# Patient Record
Sex: MALE | Race: WHITE | URBAN - METROPOLITAN AREA
[De-identification: names, ages, dates, MRNs, and addresses within clinical notes are randomized per-mention and may not be internally consistent; named-entity substitution may affect disease eponyms.]

---

## 2021-01-28 ENCOUNTER — OUT OF OFFICE VISIT (OUTPATIENT)
Dept: URBAN - METROPOLITAN AREA MEDICAL CENTER 27 | Facility: MEDICAL CENTER | Age: 46
End: 2021-01-28
Payer: COMMERCIAL

## 2021-01-28 DIAGNOSIS — R07.89 ACUTE CHEST WALL PAIN: ICD-10-CM

## 2021-01-28 DIAGNOSIS — N17.9 ACUTE KIDNEY FAILURE: ICD-10-CM

## 2021-01-28 DIAGNOSIS — K76.0 FATTY (CHANGE OF) LIVER, NOT ELSEWHERE CLASSIFIED: ICD-10-CM

## 2021-01-28 DIAGNOSIS — R11.2 ACUTE NAUSEA WITH NONBILIOUS VOMITING: ICD-10-CM

## 2021-01-28 PROCEDURE — 99232 SBSQ HOSP IP/OBS MODERATE 35: CPT | Performed by: INTERNAL MEDICINE

## 2021-01-28 PROCEDURE — 99222 1ST HOSP IP/OBS MODERATE 55: CPT | Performed by: INTERNAL MEDICINE

## 2021-01-28 PROCEDURE — G8427 DOCREV CUR MEDS BY ELIG CLIN: HCPCS | Performed by: INTERNAL MEDICINE

## 2021-02-01 ENCOUNTER — WEB ENCOUNTER (OUTPATIENT)
Dept: URBAN - METROPOLITAN AREA CLINIC 35 | Facility: CLINIC | Age: 46
End: 2021-02-01

## 2021-02-01 ENCOUNTER — TELEPHONE ENCOUNTER (OUTPATIENT)
Dept: URBAN - METROPOLITAN AREA CLINIC 35 | Facility: CLINIC | Age: 46
End: 2021-02-01

## 2021-02-01 ENCOUNTER — OFFICE VISIT (OUTPATIENT)
Dept: URBAN - METROPOLITAN AREA CLINIC 35 | Facility: CLINIC | Age: 46
End: 2021-02-01

## 2021-02-01 VITALS — BODY MASS INDEX: 24.5 KG/M2 | HEIGHT: 71 IN | WEIGHT: 175 LBS

## 2021-02-01 RX ORDER — MULTIVIT-MIN/IRON/FOLIC ACID/K 18-600-40
1 CAPSULE ORAL ONCE A DAY
Status: ACTIVE | COMMUNITY

## 2021-02-01 RX ORDER — PANTOPRAZOLE SODIUM 40 MG/1
1 TABLET TABLET, DELAYED RELEASE ORAL BID
Status: ACTIVE | COMMUNITY

## 2021-02-01 RX ORDER — OXYCODONE HYDROCHLORIDE AND ACETAMINOPHEN 5; 325 MG/1; MG/1
1 TABLET ORAL EVERY 4 HOURS PRN
Status: ACTIVE | COMMUNITY

## 2021-02-01 RX ORDER — FAMOTIDINE 20 MG/1
1 TABLET TABLET, FILM COATED ORAL DAILY
Qty: 90 TABLET | Refills: 1 | OUTPATIENT
Start: 2021-02-01

## 2021-02-01 RX ORDER — DEXLANSOPRAZOLE 60 MG/1
1 CAPSULE CAPSULE, DELAYED RELEASE ORAL TWICE A DAY
Qty: 60 CAPSULE | Refills: 3 | OUTPATIENT
Start: 2021-02-01

## 2021-02-01 RX ORDER — SUCRALFATE 1 G
1 TABLET ORAL
Status: ACTIVE | COMMUNITY

## 2021-02-01 RX ORDER — DEXTROMETHORPHAN POLISTIREX 30 MG/5 ML
1 SUSPENSION, EXTENDED RELEASE 12 HR ORAL ONCE A DAY
Status: ACTIVE | COMMUNITY

## 2021-02-01 NOTE — HPI-MIGRATED HPI
;     Gastroesophageal Reflux : Patient presents today as a new patient for a hospital follow-up after being in [facility] from 2021-2021. He presented to the emergency department on 2021 for chest pain associated with both nausea and vomiting. Patient tested positive for COVID-19 on 2021 (mild symptoms on week after) and tested positive for COVID-19 (again), on 2021, while at [facility]. Patient had a RUQ ultrasound done on 2021 and the results were as follows :  IMPRESSION : Fatty liver.   Patient had a CT of the chest without IV contrast and a CT of the abdomen and pelvis without IV contrast on 2021 and the results were as follows :  IMPRESSION :   Mild ground glass opacities right lower lobe may be infectious in etiology. Bilateral perinephric fat stranding may be seen with chronic or medical renal disease. No evidence for urinary obstruction. Nonobstructing right renal calculus. Diffuse fatty infiltration of the liver. Diverticulosis of the colon without inflammation.   Patient had a US Renal Complete on 2021 and the results were as follows :  IMPRESSION : No acute sonographic abnormalities. Probable nonobstructive 3 mm calculus in the right mid polar kidney.   [facility] was consulted for vomiting and saw patient on 2021. Pertinent information from the Gastroenterology Consultation include the following :  "CT abd/pel without contrast on admit with findings of fatty liver, otherwise no acute intra-abdominal findings. Labs significant for LA 10/WBC 17.8k on admit, now 3.8/9.4 respectively. Tbili 1.3, ALP 55, AST 46 and ALT 31. Electrolytes unremarkable."  "[facility] consulted for vomiting. No prior history of GI disease. Follows [facility] for CRC screening since his father  of colon cancer. Last colonoscopy 2020 with adenomatous polyp and recommendations for repeat screening in 3 years. Last EGD 2016 for heartburn with findings of reflux esophagitis and antral erythema and erythematous duodenopathy."  "At the time of consult, the patient reports dry heaving after ingestion of AM pills. No coffee ground emesis or hematemesis reported. Denies diarrhea and constipation. Denies abdominal pain. Reports chest tightness worse with deep inhalation as if he can't get a good breath."  "Per nursing staff, the patient went through alcohol withdrawal yesterday including hallucinations. CIWA 0 today. He reports a history of withdrawal 2 years ago. Admits to heavy drinking, 3-4 drinks (a couple of shots of vodka on ice each) and wine."  Patient was discharged from the hospital with Carafate 1 GM, to be taken four times a day before meals. He was also prescribed Protonix 40 mg, BID. Patient states that these medications seem to be helping, but not a whole lot. He continues to struggle with severe heartburn. Patient cannot lay down at night because this exasperates all of his symptoms. The reflux is so bad that it wakes him up at night, if he is able to fall asleep at all. Patient is also experiencing nausea and hypersalivation. He is having chest pain related to reflux and he feels the chest pain "between his sternum and the bottom of his throat". Patient also admits to dysphagia, with his own saliva, food and liquids. Patient states that it feels like (the food, saliva and liquids) are "getting hung up a bit" before he is able to swallow completely. He states that just time and drinking extra fluids help these things go down. He admits to fatigue.  Black material in emesis at time of episode.  No early satiety is noted, but he is not eating much.  Pain is not associated with movement or taking deep breaths.  Some history of aspirin use is noted, some of which may have been taken prior to lying down.;

## 2021-02-02 ENCOUNTER — TELEPHONE ENCOUNTER (OUTPATIENT)
Dept: URBAN - METROPOLITAN AREA CLINIC 35 | Facility: CLINIC | Age: 46
End: 2021-02-02

## 2021-02-02 RX ORDER — DEXLANSOPRAZOLE 60 MG/1
1 CAPSULE CAPSULE, DELAYED RELEASE ORAL TWICE A DAY
Qty: 60 CAPSULE | Refills: 1 | OUTPATIENT
Start: 2021-02-01

## 2021-04-20 ENCOUNTER — LAB OUTSIDE AN ENCOUNTER (OUTPATIENT)
Dept: URBAN - METROPOLITAN AREA SURGERY CENTER 8 | Facility: SURGERY CENTER | Age: 46
End: 2021-04-20

## 2021-04-20 ENCOUNTER — OFFICE VISIT (OUTPATIENT)
Dept: URBAN - METROPOLITAN AREA CLINIC 35 | Facility: CLINIC | Age: 46
End: 2021-04-20

## 2021-04-20 ENCOUNTER — TELEPHONE ENCOUNTER (OUTPATIENT)
Dept: URBAN - METROPOLITAN AREA CLINIC 35 | Facility: CLINIC | Age: 46
End: 2021-04-20

## 2021-04-20 VITALS — HEIGHT: 71 IN | BODY MASS INDEX: 23.8 KG/M2 | WEIGHT: 170 LBS

## 2021-04-20 RX ORDER — DEXTROMETHORPHAN POLISTIREX 30 MG/5 ML
1 SUSPENSION, EXTENDED RELEASE 12 HR ORAL ONCE A DAY
Status: ACTIVE | COMMUNITY

## 2021-04-20 RX ORDER — DEXLANSOPRAZOLE 60 MG/1
1 CAPSULE CAPSULE, DELAYED RELEASE ORAL TWICE A DAY
Qty: 60 CAPSULE | Refills: 1 | Status: DISCONTINUED | COMMUNITY
Start: 2021-02-01

## 2021-04-20 RX ORDER — MULTIVIT-MIN/IRON/FOLIC ACID/K 18-600-40
1 CAPSULE ORAL ONCE A DAY
Status: ACTIVE | COMMUNITY

## 2021-04-20 RX ORDER — OMEPRAZOLE 40 MG/1
1 CAPSULE CAPSULE, DELAYED RELEASE ORAL ONCE A DAY
Qty: 30 | Refills: 5 | OUTPATIENT
Start: 2021-04-20

## 2021-04-20 RX ORDER — SUCRALFATE 1 G
1 TABLET ORAL
Status: ACTIVE | COMMUNITY

## 2021-04-20 RX ORDER — PANTOPRAZOLE SODIUM 40 MG/1
1 TABLET TABLET, DELAYED RELEASE ORAL BID
Status: DISCONTINUED | COMMUNITY

## 2021-04-20 RX ORDER — EPINEPHRINE 0.3 MG/.3ML
AS DIRECTED INJECTION INTRAMUSCULAR ONCE AS NEEDED
Qty: 1 PACK | Refills: 1 | OUTPATIENT
Start: 2021-04-20

## 2021-04-20 RX ORDER — OXYCODONE HYDROCHLORIDE AND ACETAMINOPHEN 5; 325 MG/1; MG/1
1 TABLET ORAL EVERY 4 HOURS PRN
Status: ACTIVE | COMMUNITY

## 2021-04-20 RX ORDER — FAMOTIDINE 20 MG/1
1 TABLET TABLET, FILM COATED ORAL DAILY
Qty: 90 TABLET | Refills: 1 | Status: ACTIVE | COMMUNITY
Start: 2021-02-01

## 2021-04-20 NOTE — EXAM-MIGRATED EXAMINATIONS
GENERAL APPEARANCE: - alert, in no acute distress, well developed, well nourished;   ORAL CAVITY: - mucosa moist.  MP 1;

## 2021-04-20 NOTE — HPI-MIGRATED HPI
;     Gastroesophageal Reflux : Patient presents today as a new patient for a follow-up office visit from 02/01/2021. He is NOT currently taking Dexilant 60 mg, twice a day. He discontinued the Dexilant due to an allergic reaction (hives and lip swelling) and this has been added to his allergy list. Patient recently started back on the Carafate, 1 GM, before meals. The only thing that patient is taking for GERD (at the moment) is Famotidine 20 mg, daily. Patient is being seen today for a hospital follow-up from 04/09/2021-04/12/2021. He was seen at [facility] for nausea and vomiting. Patient states that he tested positive (again) for COVID on 04/09/2021 when he went to [facility]. Per patient, a CT scan, ultrasound and chest X-Ray were done at [facility] and the only finding was diverticulosis and fatty liver. These reports are unavailable for review as the portals are not accessible, currently. Patient denies any nausea or vomiting since leaving the hospital. He does admit to "throat tightening". He would like to discuss scheduling the EGD that was advised.  He states he has not had any alcohol since prior to hospital.;

## 2021-05-04 LAB
ABSOLUTE BASOPHILS: 31
ABSOLUTE EOSINOPHILS: 48
ABSOLUTE LYMPHOCYTES: 1126
ABSOLUTE MONOCYTES: 524
ABSOLUTE NEUTROPHILS: 2671
ALBUMIN/GLOBULIN RATIO: 1.7
ALBUMIN: 4.1
ALKALINE PHOSPHATASE: 52
ALT: 11
AST: 14
BASOPHILS: 0.7
BILIRUBIN, TOTAL: 0.5
BUN/CREATININE RATIO: (no result)
CALCIUM: 9.1
CARBON DIOXIDE: 29
CHLORIDE: 104
CREATININE: 0.96
EGFR AFRICAN AMERICAN: 110
EGFR NON-AFR. AMERICAN: 95
EOSINOPHILS: 1.1
GLOBULIN: 2.4
GLUCOSE: 116
HEMATOCRIT: 34.7
HEMOGLOBIN: 12.1
LYMPHOCYTES: 25.6
MCH: 32.4
MCHC: 34.9
MCV: 93
MONOCYTES: 11.9
MPV: 10.3
NEUTROPHILS: 60.7
PLATELET COUNT: 243
POTASSIUM: 3.7
PROTEIN, TOTAL: 6.5
RDW: 13.6
RED BLOOD CELL COUNT: 3.73
SODIUM: 141
UREA NITROGEN (BUN): 11
WHITE BLOOD CELL COUNT: 4.4

## 2021-05-14 ENCOUNTER — TELEPHONE ENCOUNTER (OUTPATIENT)
Dept: URBAN - METROPOLITAN AREA CLINIC 35 | Facility: CLINIC | Age: 46
End: 2021-05-14

## 2021-05-27 ENCOUNTER — OFFICE VISIT (OUTPATIENT)
Dept: URBAN - METROPOLITAN AREA SURGERY CENTER 8 | Facility: SURGERY CENTER | Age: 46
End: 2021-05-27

## 2021-06-08 ENCOUNTER — OFFICE VISIT (OUTPATIENT)
Dept: URBAN - METROPOLITAN AREA CLINIC 31 | Facility: CLINIC | Age: 46
End: 2021-06-08

## 2021-06-08 PROBLEM — 698352000: Status: ACTIVE | Noted: 2021-06-08

## 2021-06-08 PROBLEM — 415116008: Status: ACTIVE | Noted: 2021-04-20

## 2021-06-08 RX ORDER — SUCRALFATE 1 G
1 TABLET ORAL
Status: ACTIVE | COMMUNITY

## 2021-06-08 RX ORDER — DEXTROMETHORPHAN POLISTIREX 30 MG/5 ML
1 SUSPENSION, EXTENDED RELEASE 12 HR ORAL ONCE A DAY
Status: ACTIVE | COMMUNITY

## 2021-06-08 RX ORDER — MULTIVIT-MIN/IRON/FOLIC ACID/K 18-600-40
1 CAPSULE ORAL ONCE A DAY
Status: ACTIVE | COMMUNITY

## 2021-06-08 RX ORDER — FAMOTIDINE 20 MG/1
1 TABLET TABLET, FILM COATED ORAL DAILY
Qty: 90 TABLET | Refills: 1 | Status: ACTIVE | COMMUNITY
Start: 2021-02-01

## 2021-06-08 RX ORDER — EPINEPHRINE 0.3 MG/.3ML
AS DIRECTED INJECTION INTRAMUSCULAR ONCE AS NEEDED
Qty: 1 PACK | Refills: 1 | Status: ACTIVE | COMMUNITY
Start: 2021-04-20

## 2021-06-08 RX ORDER — OXYCODONE HYDROCHLORIDE AND ACETAMINOPHEN 5; 325 MG/1; MG/1
1 TABLET ORAL EVERY 4 HOURS PRN
Status: ACTIVE | COMMUNITY

## 2021-06-08 RX ORDER — OMEPRAZOLE 40 MG/1
1 CAPSULE CAPSULE, DELAYED RELEASE ORAL ONCE A DAY
Qty: 30 | Refills: 5 | Status: ACTIVE | COMMUNITY
Start: 2021-04-20

## 2021-06-08 NOTE — HPI-MIGRATED HPI
;     Follow up- EGD : Patient presents today for a follow-up from the EGD done on 05/27/2021. Since having the procedure, patient denies any odynophagia, dysphagia, or globus sensation. He had no complications following his procedure. Patient states that since his last office visit, he has not had any nausea or vomiting. The throat tightening has been ---. Labs were completed on 05/03/2021 and the results are in EMR. Patient continues taking Omeprazole 40 mg, daily, and Famotidine 20 mg, at bedtime.;